# Patient Record
Sex: MALE | Race: WHITE | NOT HISPANIC OR LATINO | Employment: OTHER | URBAN - METROPOLITAN AREA
[De-identification: names, ages, dates, MRNs, and addresses within clinical notes are randomized per-mention and may not be internally consistent; named-entity substitution may affect disease eponyms.]

---

## 2017-02-20 ENCOUNTER — OFFICE VISIT (OUTPATIENT)
Dept: FAMILY MEDICINE CLINIC | Facility: CLINIC | Age: 22
End: 2017-02-20

## 2017-02-20 VITALS
RESPIRATION RATE: 20 BRPM | TEMPERATURE: 98.8 F | HEART RATE: 88 BPM | BODY MASS INDEX: 25.81 KG/M2 | DIASTOLIC BLOOD PRESSURE: 62 MMHG | WEIGHT: 212 LBS | SYSTOLIC BLOOD PRESSURE: 120 MMHG

## 2017-02-20 DIAGNOSIS — J10.1 INFLUENZA A: Primary | ICD-10-CM

## 2017-02-20 DIAGNOSIS — R50.9 FEVER, UNSPECIFIED FEVER CAUSE: ICD-10-CM

## 2017-02-20 DIAGNOSIS — R52 BODY ACHES: ICD-10-CM

## 2017-02-20 DIAGNOSIS — J40 BRONCHITIS: ICD-10-CM

## 2017-02-20 DIAGNOSIS — R05.9 COUGH: ICD-10-CM

## 2017-02-20 LAB
EXPIRATION DATE: ABNORMAL
FLUAV AG NPH QL: POSITIVE
FLUBV AG NPH QL: NEGATIVE
INTERNAL CONTROL: ABNORMAL
Lab: ABNORMAL

## 2017-02-20 PROCEDURE — 87804 INFLUENZA ASSAY W/OPTIC: CPT | Performed by: PHYSICIAN ASSISTANT

## 2017-02-20 PROCEDURE — 99213 OFFICE O/P EST LOW 20 MIN: CPT | Performed by: PHYSICIAN ASSISTANT

## 2017-02-20 RX ORDER — METHYLPREDNISOLONE 4 MG/1
TABLET ORAL
Qty: 21 TABLET | Refills: 0 | Status: SHIPPED | OUTPATIENT
Start: 2017-02-20 | End: 2017-10-02

## 2017-02-20 RX ORDER — BENZONATATE 200 MG/1
200 CAPSULE ORAL 3 TIMES DAILY PRN
Qty: 20 CAPSULE | Refills: 0 | Status: SHIPPED | OUTPATIENT
Start: 2017-02-20 | End: 2017-10-02

## 2017-02-20 RX ORDER — AZITHROMYCIN 250 MG/1
TABLET, FILM COATED ORAL
Qty: 6 TABLET | Refills: 0 | Status: SHIPPED | OUTPATIENT
Start: 2017-02-20 | End: 2017-10-02

## 2017-02-20 NOTE — PROGRESS NOTES
Subjective   Júnior NUNEZ [place] is a 21 y.o. male.     History of Present Illness   Patient presents with CC of fever, fatigue, body aches, productive cough (thick, paste-like yellow sputum) that started suddenly 4 days ago. No improvement of symptoms over this time. Chest congestion seems to be worse. Did not take temperature but felt feverish. Vomited once. Denies any additional N/V/D, chest pain, SOB, abdominal pain or urinary symptoms. Has not received flu shot this year. No known contacts. Has an exam tomorrow at [facility].     The following portions of the patient's history were reviewed and updated as appropriate: allergies, current medications, past family history, past medical history, past social history, past surgical history and problem list.    Review of Systems   Constitutional: Positive for fatigue and fever. Negative for chills and diaphoresis.   HENT: Positive for congestion and postnasal drip. Negative for ear discharge, ear pain, hearing loss, nosebleeds, sinus pressure, sneezing and sore throat.    Eyes: Negative.    Respiratory: Positive for cough. Negative for chest tightness, shortness of breath and wheezing.    Cardiovascular: Negative.  Negative for chest pain, palpitations and leg swelling.   Gastrointestinal: Negative for abdominal distention, abdominal pain, anal bleeding, blood in stool, constipation, diarrhea, nausea, rectal pain and vomiting.   Endocrine: Negative.  Negative for cold intolerance, heat intolerance, polydipsia, polyphagia and polyuria.   Genitourinary: Negative.  Negative for difficulty urinating, dysuria, flank pain, frequency, hematuria and urgency.   Musculoskeletal: Positive for myalgias. Negative for arthralgias, back pain, gait problem, joint swelling, neck pain and neck stiffness.   Skin: Negative.  Negative for color change, pallor, rash and wound.   Allergic/Immunologic: Negative.  Negative for immunocompromised state.   Neurological: Negative for dizziness, syncope,  weakness, light-headedness, numbness and headaches.   Hematological: Negative.  Negative for adenopathy. Does not bruise/bleed easily.   Psychiatric/Behavioral: Negative.  Negative for behavioral problems, confusion, self-injury, sleep disturbance and suicidal ideas. The patient is not nervous/anxious.        Objective    Blood pressure 120/62, pulse 88, temperature 98.8 °F (37.1 °C), resp. rate 20, weight 212 lb (96.2 kg).     Physical Exam   Constitutional: He is oriented to person, place, and time. He appears well-developed and well-nourished.   HENT:   Head: Normocephalic and atraumatic.   Right Ear: Tympanic membrane, external ear and ear canal normal.   Left Ear: Tympanic membrane, external ear and ear canal normal.   Nose: Mucosal edema present. No rhinorrhea. Right sinus exhibits no maxillary sinus tenderness and no frontal sinus tenderness. Left sinus exhibits no maxillary sinus tenderness and no frontal sinus tenderness.   Mouth/Throat: Uvula is midline. Posterior oropharyngeal erythema present. No oropharyngeal exudate or posterior oropharyngeal edema. Tonsils are 1+ on the right. Tonsils are 1+ on the left.   Eyes: Conjunctivae and EOM are normal. Pupils are equal, round, and reactive to light.   Neck: Normal range of motion. Neck supple. No tracheal deviation present. No thyromegaly present.   Cardiovascular: Normal rate, regular rhythm, normal heart sounds and intact distal pulses.  Exam reveals no gallop and no friction rub.    No murmur heard.  Pulmonary/Chest: Effort normal and breath sounds normal. No respiratory distress. He has no wheezes. He has no rales. He exhibits no tenderness.   Abdominal: Soft. Bowel sounds are normal. He exhibits no distension and no mass. There is no tenderness. There is no rebound and no guarding. No hernia.   Lymphadenopathy:     He has no cervical adenopathy.   Neurological: He is alert and oriented to person, place, and time.   Skin: Skin is warm and dry.    Psychiatric: He has a normal mood and affect. His behavior is normal. Judgment and thought content normal.   Nursing note and vitals reviewed.      Assessment/Plan   Júnior was seen today for uri, cough, generalized body aches, fever and fatigue.    Diagnoses and all orders for this visit:    Influenza A    Fever, unspecified fever cause  -     POC Influenza A / B    Body aches  -     POC Influenza A / B    Cough  -     MethylPREDNISolone (MEDROL, ROHITH,) 4 MG tablet; Take as directed on package instructions.  -     benzonatate (TESSALON) 200 MG capsule; Take 1 capsule by mouth 3 (Three) Times a Day As Needed for cough.    Bronchitis  -     azithromycin (ZITHROMAX Z-ROHITH) 250 MG tablet; Take 2 tablets the first day, then 1 tablet daily for 4 days.      Positive Influenza A, however too late of presentation for Tamiflu. Seems to be progressing to bronchitis. Pt provided with antibiotic to begin in 24-48 hours if chest symptoms do not improve or if new or worsening symptoms develop. Rest, fluids, isolation. School note provided. Symptomatic treatment as outlined in plan. F/U INB

## 2017-10-02 ENCOUNTER — OFFICE VISIT (OUTPATIENT)
Dept: FAMILY MEDICINE CLINIC | Facility: CLINIC | Age: 22
End: 2017-10-02

## 2017-10-02 VITALS
TEMPERATURE: 99.3 F | RESPIRATION RATE: 20 BRPM | DIASTOLIC BLOOD PRESSURE: 90 MMHG | SYSTOLIC BLOOD PRESSURE: 116 MMHG | HEIGHT: 76 IN | BODY MASS INDEX: 26.3 KG/M2 | WEIGHT: 216 LBS | HEART RATE: 80 BPM

## 2017-10-02 DIAGNOSIS — S89.91XA INJURY OF RIGHT KNEE, INITIAL ENCOUNTER: Primary | ICD-10-CM

## 2017-10-02 DIAGNOSIS — Z20.2 EXPOSURE TO CHLAMYDIA: ICD-10-CM

## 2017-10-02 PROCEDURE — 99213 OFFICE O/P EST LOW 20 MIN: CPT | Performed by: FAMILY MEDICINE

## 2017-10-02 RX ORDER — DOXYCYCLINE 100 MG/1
100 CAPSULE ORAL 2 TIMES DAILY
Qty: 14 CAPSULE | Refills: 0 | Status: CANCELLED | OUTPATIENT
Start: 2017-10-02

## 2017-10-02 RX ORDER — AZITHROMYCIN 500 MG/1
1000 TABLET, FILM COATED ORAL DAILY
Qty: 2 TABLET | Refills: 0 | Status: SHIPPED | OUTPATIENT
Start: 2017-10-02 | End: 2017-10-03

## 2017-10-02 NOTE — PATIENT INSTRUCTIONS
Go to the nearest ER or return to clinic if symptoms worsen, fever/chill develop      Knee Pain  Knee pain is a common problem. It can have many causes. The pain often goes away by following your doctor's home care instructions. Treatment for ongoing pain will depend on the cause of your pain. If your knee pain continues, more tests may be needed to diagnose your condition. Tests may include X-rays or other imaging studies of your knee.  HOME CARE  · Take medicines only as told by your doctor.  · Rest your knee and keep it raised (elevated) while you are resting.  · Do not do things that cause pain or make your pain worse.  · Avoid activities where both feet leave the ground at the same time, such as running, jumping rope, or doing jumping jacks.  · Apply ice to the knee area:    Put ice in a plastic bag.    Place a towel between your skin and the bag.    Leave the ice on for 20 minutes, 2-3 times a day.  · Ask your doctor if you should wear an elastic knee support.  · Sleep with a pillow under your knee.  · Lose weight if you are overweight. Being overweight can make your knee hurt more.  · Do not use any tobacco products, including cigarettes, chewing tobacco, or electronic cigarettes. If you need help quitting, ask your doctor. Smoking may slow the healing of any bone and joint problems that you may have.  GET HELP IF:  · Your knee pain does not stop, it changes, or it gets worse.  · You have a fever along with knee pain.  · Your knee gives out or locks up.  · Your knee becomes more swollen.  GET HELP RIGHT AWAY IF:   · Your knee feels hot to the touch.  · You have chest pain or trouble breathing.     This information is not intended to replace advice given to you by your health care provider. Make sure you discuss any questions you have with your health care provider.     Document Released: 03/16/2010 Document Revised: 01/08/2016 Document Reviewed: 02/18/2015  Elsevier Interactive Patient Education ©2017  [employer]

## 2017-10-02 NOTE — PROGRESS NOTES
Subjective   Júnior NUNEZ [place] is a 21 y.o. male.     Knee Pain    The incident occurred 3 to 5 days ago. The incident occurred at work (He was loading rock into a trailer and stepped wrong). Injury mechanism: hyperextension. The pain is present in the right knee. The quality of the pain is described as aching. The pain is at a severity of 4/10. The pain is mild. The pain has been intermittent (only occurs with certain motions) since onset. Pertinent negatives include no inability to bear weight, numbness or tingling. He reports no foreign bodies present. The symptoms are aggravated by movement and weight bearing. He has tried acetaminophen for the symptoms. The treatment provided mild relief.     He is worried that he has chlamydia, his girlfriend was recently confirmed and treated.   They have unprotected sex on regular basis. He is requesting treatment.   Denies penile discharge, dysuria, nausea, vomiting, fever, chills.    The following portions of the patient's history were reviewed and updated as appropriate: allergies, current medications, past family history, past medical history, past social history, past surgical history and problem list.    Review of Systems   Constitutional: Negative for chills and fever.   Respiratory: Negative.    Cardiovascular: Negative.    Gastrointestinal: Negative for abdominal pain, nausea and vomiting.   Genitourinary: Negative for decreased urine volume, discharge, dysuria, frequency, penile pain, penile swelling, scrotal swelling and testicular pain.   Musculoskeletal: Positive for arthralgias (right knee). Negative for gait problem and joint swelling.   Skin: Negative for rash.   Neurological: Negative for dizziness, tingling and numbness.   Hematological: Negative for adenopathy.       Objective   Physical Exam   Constitutional: He is oriented to person, place, and time. He appears well-developed and well-nourished.   HENT:   Head: Normocephalic and atraumatic.   Nose: Nose  normal.   Eyes: Conjunctivae are normal.   Cardiovascular: Normal rate, regular rhythm and normal heart sounds.    Pulmonary/Chest: Effort normal and breath sounds normal.   Musculoskeletal: He exhibits no edema or deformity.        Right knee: He exhibits normal range of motion, no swelling, no LCL laxity, normal patellar mobility, no bony tenderness and no MCL laxity. Tenderness found. Lateral joint line tenderness noted. No medial joint line, no MCL, no LCL and no patellar tendon tenderness noted.   Neurological: He is alert and oriented to person, place, and time.   Skin: Skin is warm and dry.   Psychiatric: He has a normal mood and affect. His behavior is normal.   Nursing note and vitals reviewed.      Assessment/Plan   Júnior was seen today for knee pain.    Diagnoses and all orders for this visit:    Injury of right knee, initial encounter    Exposure to chlamydia  -     azithromycin (ZITHROMAX) 500 MG tablet; Take 2 tablets by mouth Daily for 1 day.    Other orders  -     Cancel: doxycycline (MONODOX) 100 MG capsule; Take 1 capsule by mouth 2 (Two) Times a Day.      Since he doesn't have insurance coverage, he doesn't want to proceed with xray of right knee or PT. He has been advised to take OTC ibuprofen 800mg TID prn for pain relief. If symptoms don't resolve, he will return for additional workup.   Will treat chlamydia with Azithromycin. Have advised him to return in 1 week to ensure chlamydia has resolved, will order urine evaluation gonorrhea/chlamydia.